# Patient Record
Sex: MALE | Race: WHITE | NOT HISPANIC OR LATINO | Employment: OTHER | URBAN - NONMETROPOLITAN AREA
[De-identification: names, ages, dates, MRNs, and addresses within clinical notes are randomized per-mention and may not be internally consistent; named-entity substitution may affect disease eponyms.]

---

## 2023-10-24 ENCOUNTER — OFFICE VISIT (OUTPATIENT)
Dept: FAMILY MEDICINE CLINIC | Facility: CLINIC | Age: 63
End: 2023-10-24
Payer: COMMERCIAL

## 2023-10-24 DIAGNOSIS — S06.9XAD TRAUMATIC BRAIN INJURY, WITH UNKNOWN LOSS OF CONSCIOUSNESS STATUS, SUBSEQUENT ENCOUNTER: ICD-10-CM

## 2023-10-24 DIAGNOSIS — I10 HYPERTENSION, UNSPECIFIED TYPE: Primary | ICD-10-CM

## 2023-10-24 DIAGNOSIS — Z86.79 HX OF SUBDURAL HEMATOMA: ICD-10-CM

## 2023-10-24 DIAGNOSIS — Z87.898 HISTORY OF ALCOHOL USE DISORDER: ICD-10-CM

## 2023-10-24 PROCEDURE — 99212 OFFICE O/P EST SF 10 MIN: CPT | Performed by: FAMILY MEDICINE

## 2023-10-31 ENCOUNTER — OFFICE VISIT (OUTPATIENT)
Dept: FAMILY MEDICINE CLINIC | Facility: CLINIC | Age: 63
End: 2023-10-31
Payer: COMMERCIAL

## 2023-10-31 DIAGNOSIS — S06.9X9A TRAUMATIC BRAIN INJURY WITH LOSS OF CONSCIOUSNESS, INITIAL ENCOUNTER (HCC): ICD-10-CM

## 2023-10-31 DIAGNOSIS — Z87.898 HISTORY OF ALCOHOL USE DISORDER: ICD-10-CM

## 2023-10-31 DIAGNOSIS — R41.3 MEMORY LOSS: ICD-10-CM

## 2023-10-31 DIAGNOSIS — S06.9XAS TRAUMATIC BRAIN INJURY, WITH UNKNOWN LOSS OF CONSCIOUSNESS STATUS, SEQUELA (HCC): ICD-10-CM

## 2023-10-31 DIAGNOSIS — Z78.9 ALCOHOL USE: Primary | ICD-10-CM

## 2023-10-31 DIAGNOSIS — I10 HYPERTENSION, UNSPECIFIED TYPE: Primary | ICD-10-CM

## 2023-10-31 PROCEDURE — 99212 OFFICE O/P EST SF 10 MIN: CPT | Performed by: FAMILY MEDICINE

## 2023-10-31 RX ORDER — AMLODIPINE BESYLATE 5 MG/1
5 TABLET ORAL DAILY
COMMUNITY
Start: 2023-09-07 | End: 2024-09-06

## 2023-10-31 RX ORDER — DIPHENOXYLATE HYDROCHLORIDE AND ATROPINE SULFATE 2.5; .025 MG/1; MG/1
1 TABLET ORAL DAILY
Qty: 30 TABLET | Refills: 5 | Status: SHIPPED | OUTPATIENT
Start: 2023-10-31 | End: 2024-04-28

## 2023-10-31 RX ORDER — SOD.CHLORID/POTASSIUM CHLORIDE 287-180-15
904 TABLET ORAL
COMMUNITY
Start: 2023-09-06

## 2023-10-31 RX ORDER — GAUZE BANDAGE 2" X 2"
100 BANDAGE TOPICAL DAILY
Qty: 180 TABLET | Refills: 0 | Status: SHIPPED | OUTPATIENT
Start: 2023-10-31

## 2023-10-31 RX ORDER — LANOLIN ALCOHOL/MO/W.PET/CERES
800 CREAM (GRAM) TOPICAL DAILY
Qty: 180 TABLET | Refills: 0 | Status: SHIPPED | OUTPATIENT
Start: 2023-10-31

## 2023-10-31 RX ORDER — DIPHENOXYLATE HYDROCHLORIDE AND ATROPINE SULFATE 2.5; .025 MG/1; MG/1
1 TABLET ORAL DAILY
COMMUNITY
End: 2023-10-31 | Stop reason: SDUPTHER

## 2023-10-31 NOTE — PROGRESS NOTES
Seen at [facility]. Complete forms for nursing home eligibility. Sx: memory loss, traumatic brain injury, difficulty completing IADLs. Vitals:  Sa02 99%  Pulse 67  H 6 '2  Weight 171    Rx: multi vitamin, folate. Thiamine    Referral to Social work; please help patient with . He is unable to live alone and complete IADLs. please call Deyanira Stanley [phone].

## 2023-11-01 ENCOUNTER — PATIENT OUTREACH (OUTPATIENT)
Dept: FAMILY MEDICINE CLINIC | Facility: CLINIC | Age: 63
End: 2023-11-01

## 2023-11-01 NOTE — PROGRESS NOTES
OP CM rcvd consult for pt:     Please help patient with . He is unable to live alone and complete IADLs. We completed paperwork for patient to be nursing facility eligible. Please help patient find a location  please call [facility] [phone]. Chart reviewed.

## 2023-11-02 PROBLEM — S06.9XAA TBI (TRAUMATIC BRAIN INJURY) (HCC): Status: ACTIVE | Noted: 2023-11-02

## 2023-11-02 PROBLEM — Z86.79 HX OF SUBDURAL HEMATOMA: Status: ACTIVE | Noted: 2023-11-02

## 2023-11-02 PROBLEM — I60.9 SUBARACHNOID BLEED (HCC): Status: ACTIVE | Noted: 2023-11-02

## 2023-11-02 PROBLEM — Z87.898 HISTORY OF ALCOHOL USE DISORDER: Status: ACTIVE | Noted: 2023-11-02

## 2023-11-02 PROBLEM — I10 HYPERTENSION: Status: ACTIVE | Noted: 2023-11-02

## 2023-11-02 NOTE — PROGRESS NOTES
Assessment/Plan:    Hypertension  Currently well controlled  Continue amlodipine 5mg QD    History of alcohol use disorder  Advised to abstain  Continue Folic acid, thiamine, multi vit    Hx of subdural hematoma  Presented to ED 8/30/23    TBI (traumatic brain injury) ([address] )  TBI 7/5/22     Diagnoses and all orders for this visit:    Hypertension, unspecified type    Traumatic brain injury, with unknown loss of consciousness status, subsequent encounter    History of alcohol use disorder    Hx of subdural hematoma          Subjective:      Patient ID: Alissa Root is a 59 y.o. male. Mr Sabino Rogers is a 60y M w a PMH of HTN, h/o alcohol use disorder, TBI, unstable housing, being seen to establish care. The patient is currently in temporary housing, being assisted by a close friend working with [facility]. There is some concerns for personal safety with the patients current living situation, as there is drug and alcohol use in the area, and the patient still struggles with abstaining from alcohol consumption. The patient also likes to take early morning walks, but there is some concern with his memory, and he has more than once gotten lost or been confused after walking long distances from his current home. The patient does not seem to have difficulty with most ADLs, but his memory is making maintaining safety and remembering to take medications etc more difficult. He has no family in the area to help with care taking, and is currently unemployed. He is otherwise in his usual state of health without any other acute health concerns. He would benefit from living in a nursing facility or similar setting with regular care and monitoring. We will continue to provide primary care and connect the patient to community resources.          The following portions of the patient's history were reviewed and updated as appropriate: allergies, current medications, past family history, past medical history, past social history, past surgical history, and problem list.    Review of Systems   Constitutional:  Negative for activity change, appetite change, chills, fatigue, fever and unexpected weight change. HENT:  Negative for congestion, ear pain, postnasal drip, rhinorrhea and sore throat. Eyes:  Negative for pain and visual disturbance. Respiratory:  Negative for cough, chest tightness, shortness of breath and wheezing. Cardiovascular:  Negative for chest pain, palpitations and leg swelling. Gastrointestinal:  Negative for abdominal pain, constipation, diarrhea, nausea and vomiting. Genitourinary:  Negative for dysuria, flank pain and hematuria. Musculoskeletal:  Negative for arthralgias and back pain. Skin:  Negative for color change and rash. Neurological:  Negative for dizziness, seizures, syncope, weakness, light-headedness, numbness and headaches. Psychiatric/Behavioral:  Positive for confusion. Negative for agitation, behavioral problems, hallucinations and self-injury. The patient is not nervous/anxious. All other systems reviewed and are negative. Objective: There were no vitals taken for this visit. Physical Exam  Vitals and nursing note reviewed. Constitutional:       Appearance: Normal appearance. HENT:      Head: Normocephalic and atraumatic. Right Ear: External ear normal.      Left Ear: External ear normal.      Nose: Nose normal.      Mouth/Throat:      Mouth: Mucous membranes are moist.      Pharynx: Oropharynx is clear. Eyes:      General: No scleral icterus. Extraocular Movements: Extraocular movements intact. Conjunctiva/sclera: Conjunctivae normal.   Cardiovascular:      Rate and Rhythm: Normal rate and regular rhythm. Pulses: Normal pulses. Heart sounds: Normal heart sounds. No murmur heard. Pulmonary:      Effort: Pulmonary effort is normal.      Breath sounds: Normal breath sounds. No wheezing. Abdominal:      General: Abdomen is flat. Bowel sounds are normal.      Palpations: Abdomen is soft. Tenderness: There is no abdominal tenderness. There is no guarding. Musculoskeletal:         General: No deformity or signs of injury. Normal range of motion. Cervical back: Normal range of motion. No rigidity. Skin:     General: Skin is warm and dry. Coloration: Skin is not jaundiced. Neurological:      General: No focal deficit present. Mental Status: He is alert and oriented to person, place, and time.    Psychiatric:         Mood and Affect: Mood normal.         Behavior: Behavior normal.

## 2023-11-11 NOTE — PROGRESS NOTES
Assessment/Plan:    No problem-specific Assessment & Plan notes found for this encounter. Diagnoses and all orders for this visit:    Hypertension, unspecified type    History of alcohol use disorder    Traumatic brain injury, with unknown loss of consciousness status, sequela (HCC)          Subjective:      Patient ID: Maine Griffiths is a 59 y.o. male. Mr Trevor Worthington is a 60y M w a PMH of TBI, memory difficulties, and HTN, being seen to fill out forms for placement to a nursing facility. We believe that the patient would greatly benefit from a permenent nursing facility, in which he would have safe housing away from the risk of alcohol use, and access to nursing and other services in the setting of health and ADL management. The patient is agreeable to placement at this time. We will also consult SW to see if there are other resources available to him as we pend approval for placement. Patient is otherwise in his usual state of health with no acute concerns. The following portions of the patient's history were reviewed and updated as appropriate: allergies, current medications, past family history, past medical history, past social history, past surgical history, and problem list.    Review of Systems   Constitutional:  Negative for activity change, chills and fever. HENT:  Negative for ear pain and sore throat. Eyes:  Negative for pain and visual disturbance. Respiratory:  Negative for cough and shortness of breath. Cardiovascular:  Negative for chest pain and palpitations. Gastrointestinal:  Negative for abdominal pain, constipation, diarrhea, nausea and vomiting. Genitourinary:  Negative for dysuria and hematuria. Musculoskeletal:  Positive for arthralgias, back pain and gait problem. Negative for joint swelling. Skin:  Negative for color change and rash. Neurological:  Negative for seizures, syncope and headaches. Psychiatric/Behavioral:  Positive for confusion.  Negative for sleep disturbance. All other systems reviewed and are negative. Objective: There were no vitals taken for this visit. Physical Exam  Constitutional:       General: He is not in acute distress. Appearance: Normal appearance. He is normal weight. He is not ill-appearing. HENT:      Head: Normocephalic and atraumatic. Right Ear: External ear normal.      Left Ear: External ear normal.      Nose: Nose normal.      Mouth/Throat:      Mouth: Mucous membranes are moist.      Pharynx: Oropharynx is clear. Eyes:      General: No scleral icterus. Extraocular Movements: Extraocular movements intact. Conjunctiva/sclera: Conjunctivae normal.   Cardiovascular:      Rate and Rhythm: Normal rate and regular rhythm. Pulses: Normal pulses. Heart sounds: Normal heart sounds. No murmur heard. Pulmonary:      Effort: Pulmonary effort is normal.      Breath sounds: Normal breath sounds. No wheezing. Abdominal:      General: Abdomen is flat. Bowel sounds are normal.      Tenderness: There is no abdominal tenderness. There is no guarding. Musculoskeletal:         General: No swelling. Normal range of motion. Cervical back: Normal range of motion. No rigidity. Skin:     General: Skin is warm and dry. Coloration: Skin is not jaundiced. Findings: No bruising or erythema. Neurological:      General: No focal deficit present. Mental Status: He is alert and oriented to person, place, and time.    Psychiatric:         Mood and Affect: Mood normal.

## 2023-11-28 ENCOUNTER — OFFICE VISIT (OUTPATIENT)
Dept: FAMILY MEDICINE CLINIC | Facility: CLINIC | Age: 63
End: 2023-11-28
Payer: COMMERCIAL

## 2023-11-28 ENCOUNTER — PATIENT OUTREACH (OUTPATIENT)
Dept: CASE MANAGEMENT | Facility: HOSPITAL | Age: 63
End: 2023-11-28

## 2023-11-28 DIAGNOSIS — S06.9XAS TRAUMATIC BRAIN INJURY, WITH UNKNOWN LOSS OF CONSCIOUSNESS STATUS, SEQUELA (HCC): Primary | ICD-10-CM

## 2023-11-28 PROCEDURE — 99213 OFFICE O/P EST LOW 20 MIN: CPT | Performed by: FAMILY MEDICINE

## 2023-11-28 NOTE — PROGRESS NOTES
Patient seen at [facility]. Follow Up visit.       HTN  /70  Hold Amlodipine 5mg for now  Recheck BP in 2 weeks    Cognitive Impairment  Referral to Social Work

## 2023-11-28 NOTE — PROGRESS NOTES
OP CM rcvd message on this pt for care planning. Pt has not established care at PCP office. Services for pt will need to be coordinated with care team at [facility]. Message sent back to PCP to make aware.

## 2023-11-29 NOTE — ASSESSMENT & PLAN NOTE
Seen at [facility]  Has cognitive impairment secondary to TBI  Concerns:   living situation, currently [facility], temporary  Finances; unable to pay bills, susceptible to scams  Patient in need of publicly appointed POA  Plan:  Referral to   Contact Adult protective services

## 2023-11-29 NOTE — PROGRESS NOTES
Assessment/Plan:    TBI (traumatic brain injury) ([address] )  Seen at [facility]  Has cognitive impairment secondary to TBI  Concerns:   living situation, currently living at home provide by [employer] [facility] [employer], temporary  Finances; unable to pay bills, susceptible to scams  Patient in need of publicly appointed POA  Plan:  Referral to   Contact Adult protective services         Diagnoses and all orders for this visit:    Traumatic brain injury, with unknown loss of consciousness status, sequela ([address] )          Subjective:      Patient ID: Jose Alberto Campbell is a 59 y.o. male. Patient is a 28-year-old male with past medical history of traumatic brain injury and cognitive impairment. Patient seen at [address] [place] [address]. Patient seen for follow-up. Patient's friend, Rich [place], has great concern for his friend. He does not have the mental capacity to take care of himself, take care of his finances, stay safe and out of trouble. Forms completed for patient to be placed in permanent living facility. Patient in need of publicly appointed POA. Past social history includes ;  recently incarcerated for alcohol substance use disorder related activity. Released from incarceration, has temporary living accommodation by  Association. Recently did not return home for multiple days, patient's whereabouts unknown to his friend. The following portions of the patient's history were reviewed and updated as appropriate: allergies, current medications, past family history, past medical history, past social history, past surgical history, and problem list.    Review of Systems   Unable to perform ROS: Other         Poor Historian    Objective: There were no vitals taken for this visit. Physical Exam  Constitutional:       General: He is not in acute distress. Appearance: Normal appearance. HENT:      Head: Normocephalic.       Right Ear: External ear normal.      Left Ear: External ear normal.      Nose: No rhinorrhea. Mouth/Throat:      Mouth: Mucous membranes are moist.      Pharynx: Oropharynx is clear. Eyes:      General:         Right eye: No discharge. Left eye: No discharge. Conjunctiva/sclera: Conjunctivae normal.   Cardiovascular:      Rate and Rhythm: Normal rate and regular rhythm. Pulses: Normal pulses. Heart sounds: Normal heart sounds. No murmur heard. Pulmonary:      Effort: Pulmonary effort is normal.      Breath sounds: Normal breath sounds. No wheezing. Abdominal:      General: Abdomen is flat. Palpations: Abdomen is soft. Musculoskeletal:         General: No deformity. Normal range of motion. Cervical back: Normal range of motion. Right lower leg: No edema. Left lower leg: No edema. Skin:     General: Skin is warm and dry. Neurological:      Mental Status: He is alert. Mental status is at baseline.       Comments: Cognitive impairment   Psychiatric:         Mood and Affect: Mood normal.         Behavior: Behavior normal.

## 2024-01-03 NOTE — PROGRESS NOTES
Patient seen [facility].    HTN  Stopped amlodipine 5mg on 11/28/23.  /90  Pulse 62  Ox sat 98%  Plan:  Discontinue Amlodipine 5mg  Cont to monitor BP    2. Cognitive impairment  Has cognitive impairment secondary to TBI  Concerns:   living situation, currently living at home provide by [employer] [facility], temporary  Finances; unable to pay bills, susceptible to scams  Patient in need of publicly appointed POA  Plan:  Contact Adult protective services    3. Alcohol abuse disorder  Last drink Dec 22  No withdrawal symptoms  No history of withdrawal seizure.  Continue cessation of alcohol

## 2024-01-04 ENCOUNTER — DOCUMENTATION (OUTPATIENT)
Dept: FAMILY MEDICINE CLINIC | Facility: HOME HEALTHCARE | Age: 64
End: 2024-01-04
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
## 2024-07-05 ENCOUNTER — TELEPHONE (OUTPATIENT)
Dept: FAMILY MEDICINE CLINIC | Facility: CLINIC | Age: 64
End: 2024-07-05

## 2024-07-05 NOTE — TELEPHONE ENCOUNTER
Hello,  I received a call from Jorge A with Senior services stating an MA-51 certification form was sent over to the office the be filled out. He was calling to check the status of that. I told him I would verify if it was there in the office as I don't see this in media. If you can let me know if it's there and I will call him back. Thanks!

## 2024-07-12 ENCOUNTER — TELEPHONE (OUTPATIENT)
Dept: FAMILY MEDICINE CLINIC | Facility: CLINIC | Age: 64
End: 2024-07-12

## 2024-07-12 NOTE — PROGRESS NOTES
Patient was seen at [facility]    Patient has a history of hypertension, traumatic brain injury, and history of subdural hematoma which affects his cognitive function significantly.     No acute medical concerns today. Not requiring medication refill.   He needs a physician to fill out an MA-51 certification form, and this was completed during this visit today.

## 2024-07-12 NOTE — TELEPHONE ENCOUNTER
Received a call form senior services the MA-51 form filled out needs to have nursing home eligible long term checked off. Form is being faxed to office for correction. Thanks!

## 2025-01-24 ENCOUNTER — TELEPHONE (OUTPATIENT)
Dept: FAMILY MEDICINE CLINIC | Facility: CLINIC | Age: 65
End: 2025-01-24

## 2025-01-30 DIAGNOSIS — R41.3 MEMORY LOSS: ICD-10-CM

## 2025-01-30 DIAGNOSIS — S06.9XAS TRAUMATIC BRAIN INJURY, WITH UNKNOWN LOSS OF CONSCIOUSNESS STATUS, SEQUELA (HCC): Primary | ICD-10-CM

## 2025-01-30 PROBLEM — I10 HYPERTENSION: Status: RESOLVED | Noted: 2023-11-02 | Resolved: 2025-01-30

## 2025-01-31 ENCOUNTER — TELEPHONE (OUTPATIENT)
Dept: FAMILY MEDICINE CLINIC | Facility: CLINIC | Age: 65
End: 2025-01-31

## 2025-01-31 NOTE — TELEPHONE ENCOUNTER
Lm requesting call back from Cleo. Looking to see if patient has any upcoming appts scheduled with any PCP's or specialists.
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
